# Patient Record
Sex: FEMALE | Race: BLACK OR AFRICAN AMERICAN | Employment: FULL TIME | URBAN - METROPOLITAN AREA
[De-identification: names, ages, dates, MRNs, and addresses within clinical notes are randomized per-mention and may not be internally consistent; named-entity substitution may affect disease eponyms.]

---

## 2020-03-05 ENCOUNTER — HOSPITAL ENCOUNTER (EMERGENCY)
Age: 60
Discharge: HOME | End: 2020-03-05
Attending: STUDENT IN AN ORGANIZED HEALTH CARE EDUCATION/TRAINING PROGRAM
Payer: COMMERCIAL

## 2020-03-05 VITALS
HEART RATE: 83 BPM | DIASTOLIC BLOOD PRESSURE: 54 MMHG | WEIGHT: 186 LBS | TEMPERATURE: 97.3 F | HEIGHT: 62 IN | OXYGEN SATURATION: 96 % | RESPIRATION RATE: 16 BRPM | BODY MASS INDEX: 34.23 KG/M2 | SYSTOLIC BLOOD PRESSURE: 112 MMHG

## 2020-03-05 DIAGNOSIS — A08.4 VIRAL GASTROENTERITIS: Primary | ICD-10-CM

## 2020-03-05 LAB
ALBUMIN SERPL-MCNC: 4.1 G/DL (ref 3.5–5)
ALBUMIN/GLOB SERPL: 1 {RATIO} (ref 1.1–2.2)
ALP SERPL-CCNC: 66 U/L (ref 45–117)
ALT SERPL-CCNC: 18 U/L (ref 12–78)
ANION GAP SERPL CALC-SCNC: 8 MMOL/L (ref 5–15)
APPEARANCE UR: ABNORMAL
AST SERPL-CCNC: 22 U/L (ref 15–37)
BACTERIA URNS QL MICRO: NEGATIVE /HPF
BASOPHILS # BLD: 0 K/UL (ref 0–0.1)
BASOPHILS NFR BLD: 0 % (ref 0–1)
BILIRUB SERPL-MCNC: 0.6 MG/DL (ref 0.2–1)
BILIRUB UR QL: NEGATIVE
BUN SERPL-MCNC: 16 MG/DL (ref 6–20)
BUN/CREAT SERPL: 20 (ref 12–20)
CALCIUM SERPL-MCNC: 9.2 MG/DL (ref 8.5–10.1)
CHLORIDE SERPL-SCNC: 105 MMOL/L (ref 97–108)
CO2 SERPL-SCNC: 24 MMOL/L (ref 21–32)
COLOR UR: ABNORMAL
CREAT SERPL-MCNC: 0.82 MG/DL (ref 0.55–1.02)
DIFFERENTIAL METHOD BLD: ABNORMAL
EOSINOPHIL # BLD: 0 K/UL (ref 0–0.4)
EOSINOPHIL NFR BLD: 0 % (ref 0–7)
EPITH CASTS URNS QL MICRO: ABNORMAL /LPF
ERYTHROCYTE [DISTWIDTH] IN BLOOD BY AUTOMATED COUNT: 14.2 % (ref 11.5–14.5)
GLOBULIN SER CALC-MCNC: 4 G/DL (ref 2–4)
GLUCOSE SERPL-MCNC: 149 MG/DL (ref 65–100)
GLUCOSE UR STRIP.AUTO-MCNC: NEGATIVE MG/DL
HCT VFR BLD AUTO: 46.3 % (ref 35–47)
HGB BLD-MCNC: 14.5 G/DL (ref 11.5–16)
HGB UR QL STRIP: NEGATIVE
HYALINE CASTS URNS QL MICRO: ABNORMAL /LPF (ref 0–5)
IMM GRANULOCYTES # BLD AUTO: 0 K/UL (ref 0–0.04)
IMM GRANULOCYTES NFR BLD AUTO: 0 % (ref 0–0.5)
KETONES UR QL STRIP.AUTO: NEGATIVE MG/DL
LEUKOCYTE ESTERASE UR QL STRIP.AUTO: NEGATIVE
LIPASE SERPL-CCNC: 145 U/L (ref 73–393)
LYMPHOCYTES # BLD: 1.8 K/UL (ref 0.8–3.5)
LYMPHOCYTES NFR BLD: 15 % (ref 12–49)
MCH RBC QN AUTO: 26.4 PG (ref 26–34)
MCHC RBC AUTO-ENTMCNC: 31.3 G/DL (ref 30–36.5)
MCV RBC AUTO: 84.2 FL (ref 80–99)
MONOCYTES # BLD: 0.5 K/UL (ref 0–1)
MONOCYTES NFR BLD: 4 % (ref 5–13)
NEUTS SEG # BLD: 9.9 K/UL (ref 1.8–8)
NEUTS SEG NFR BLD: 81 % (ref 32–75)
NITRITE UR QL STRIP.AUTO: NEGATIVE
NRBC # BLD: 0 K/UL (ref 0–0.01)
NRBC BLD-RTO: 0 PER 100 WBC
PH UR STRIP: 6 [PH] (ref 5–8)
PLATELET # BLD AUTO: 269 K/UL (ref 150–400)
PMV BLD AUTO: 10.7 FL (ref 8.9–12.9)
POTASSIUM SERPL-SCNC: 4.2 MMOL/L (ref 3.5–5.1)
PROT SERPL-MCNC: 8.1 G/DL (ref 6.4–8.2)
PROT UR STRIP-MCNC: ABNORMAL MG/DL
RBC # BLD AUTO: 5.5 M/UL (ref 3.8–5.2)
RBC #/AREA URNS HPF: ABNORMAL /HPF (ref 0–5)
SODIUM SERPL-SCNC: 137 MMOL/L (ref 136–145)
SP GR UR REFRACTOMETRY: 1.03 (ref 1–1.03)
UR CULT HOLD, URHOLD: NORMAL
UROBILINOGEN UR QL STRIP.AUTO: 0.2 EU/DL (ref 0.2–1)
WBC # BLD AUTO: 12.2 K/UL (ref 3.6–11)
WBC URNS QL MICRO: ABNORMAL /HPF (ref 0–4)

## 2020-03-05 PROCEDURE — 96374 THER/PROPH/DIAG INJ IV PUSH: CPT

## 2020-03-05 PROCEDURE — 36415 COLL VENOUS BLD VENIPUNCTURE: CPT

## 2020-03-05 PROCEDURE — 80053 COMPREHEN METABOLIC PANEL: CPT

## 2020-03-05 PROCEDURE — 74011000250 HC RX REV CODE- 250: Performed by: NURSE PRACTITIONER

## 2020-03-05 PROCEDURE — 74011250637 HC RX REV CODE- 250/637: Performed by: NURSE PRACTITIONER

## 2020-03-05 PROCEDURE — 85025 COMPLETE CBC W/AUTO DIFF WBC: CPT

## 2020-03-05 PROCEDURE — 96375 TX/PRO/DX INJ NEW DRUG ADDON: CPT

## 2020-03-05 PROCEDURE — 81001 URINALYSIS AUTO W/SCOPE: CPT

## 2020-03-05 PROCEDURE — 74011250636 HC RX REV CODE- 250/636: Performed by: NURSE PRACTITIONER

## 2020-03-05 PROCEDURE — 96372 THER/PROPH/DIAG INJ SC/IM: CPT

## 2020-03-05 PROCEDURE — 83690 ASSAY OF LIPASE: CPT

## 2020-03-05 PROCEDURE — 99284 EMERGENCY DEPT VISIT MOD MDM: CPT

## 2020-03-05 RX ORDER — FAMOTIDINE 20 MG/1
20 TABLET, FILM COATED ORAL 2 TIMES DAILY
Qty: 20 TAB | Refills: 0 | Status: SHIPPED | OUTPATIENT
Start: 2020-03-05 | End: 2020-03-15

## 2020-03-05 RX ORDER — ONDANSETRON 4 MG/1
4 TABLET, ORALLY DISINTEGRATING ORAL
Qty: 20 TAB | Refills: 0 | Status: SHIPPED | OUTPATIENT
Start: 2020-03-05 | End: 2022-08-13

## 2020-03-05 RX ORDER — ONDANSETRON 2 MG/ML
4 INJECTION INTRAMUSCULAR; INTRAVENOUS
Status: COMPLETED | OUTPATIENT
Start: 2020-03-05 | End: 2020-03-05

## 2020-03-05 RX ORDER — DICYCLOMINE HYDROCHLORIDE 10 MG/ML
20 INJECTION INTRAMUSCULAR
Status: COMPLETED | OUTPATIENT
Start: 2020-03-05 | End: 2020-03-05

## 2020-03-05 RX ORDER — DICYCLOMINE HYDROCHLORIDE 10 MG/1
10 CAPSULE ORAL
Qty: 20 CAP | Refills: 0 | Status: SHIPPED | OUTPATIENT
Start: 2020-03-05 | End: 2020-03-10

## 2020-03-05 RX ADMIN — FAMOTIDINE 20 MG: 10 INJECTION, SOLUTION INTRAVENOUS at 11:18

## 2020-03-05 RX ADMIN — ONDANSETRON 4 MG: 2 INJECTION INTRAMUSCULAR; INTRAVENOUS at 10:17

## 2020-03-05 RX ADMIN — LIDOCAINE HYDROCHLORIDE 40 ML: 20 SOLUTION ORAL; TOPICAL at 11:16

## 2020-03-05 RX ADMIN — DICYCLOMINE HYDROCHLORIDE 20 MG: 20 INJECTION, SOLUTION INTRAMUSCULAR at 10:19

## 2020-03-05 RX ADMIN — SODIUM CHLORIDE 1000 ML: 900 INJECTION, SOLUTION INTRAVENOUS at 10:28

## 2020-03-05 NOTE — DISCHARGE INSTRUCTIONS
Thank you for allowing us to care for you today. Please follow-up with your Primary Care provider in the next 2-3 days if your symptoms do not improve. Plan for home:       Nausea/vomiting: For the next few days watch what you eat. Eat a bland diet. Fluids are the most important. Gatorade, powerade, water are the best fluids to drink. BRAT Diet: Bananas, Rice, Applesauce, Tea/Toast.  Add foods back slowly and as you tolerate. The last type of foods to add back will be salads, acetic foods like citrus, tomatoes and spicy foods. Zofran every 6-8 hours as needed for nausea/vomiting    Bentyl 10 mg tablets every 6 hours as needed for abdominal cramping. Pepcid twice daily for 10 days. Aloe up with gastroenterology should your symptoms worsen or not improve. Come back to the ER if you have worsening symptoms, fevers over 100.9, shaking chills, nausea or vomiting. Patient Education        Gastroenteritis: Care Instructions  Your Care Instructions    Gastroenteritis is an illness that may cause nausea, vomiting, and diarrhea. It is sometimes called \"stomach flu. \" It can be caused by bacteria or a virus. You will probably begin to feel better in 1 to 2 days. In the meantime, get plenty of rest and make sure you do not become dehydrated. Dehydration occurs when your body loses too much fluid. Follow-up care is a key part of your treatment and safety. Be sure to make and go to all appointments, and call your doctor if you are having problems. It's also a good idea to know your test results and keep a list of the medicines you take. How can you care for yourself at home? · If your doctor prescribed antibiotics, take them as directed. Do not stop taking them just because you feel better. You need to take the full course of antibiotics. · Drink plenty of fluids to prevent dehydration, enough so that your urine is light yellow or clear like water.  Choose water and other caffeine-free clear liquids until you feel better. If you have kidney, heart, or liver disease and have to limit fluids, talk with your doctor before you increase your fluid intake. · Drink fluids slowly, in frequent, small amounts, because drinking too much too fast can cause vomiting. · Begin eating mild foods, such as dry toast, yogurt, applesauce, bananas, and rice. Avoid spicy, hot, or high-fat foods, and do not drink alcohol or caffeine for a day or two. Do not drink milk or eat ice cream until you are feeling better. How to prevent gastroenteritis  · Keep hot foods hot and cold foods cold. · Do not eat meats, dressings, salads, or other foods that have been kept at room temperature for more than 2 hours. · Use a thermometer to check your refrigerator. It should be between 34°F and 40°F.  · Defrost meats in the refrigerator or microwave, not on the kitchen counter. · Keep your hands and your kitchen clean. Wash your hands, cutting boards, and countertops with hot soapy water frequently. · Cook meat until it is well done. · Do not eat raw eggs or uncooked sauces made with raw eggs. · Do not take chances. If food looks or tastes spoiled, throw it out. When should you call for help? Call 911 anytime you think you may need emergency care. For example, call if:    · You vomit blood or what looks like coffee grounds.     · You passed out (lost consciousness).     · You pass maroon or very bloody stools.    Call your doctor now or seek immediate medical care if:    · You have severe belly pain.     · You have signs of needing more fluids.  You have sunken eyes, a dry mouth, and pass only a little dark urine.     · You feel like you are going to faint.     · You have increased belly pain that does not go away in 1 to 2 days.     · You have new or increased nausea, or you are vomiting.     · You have a new or higher fever.     · Your stools are black and tarlike or have streaks of blood.    Watch closely for changes in your health, and be sure to contact your doctor if:    · You are dizzy or lightheaded.     · You urinate less than usual, or your urine is dark yellow or brown.     · You do not feel better with each day that goes by. Where can you learn more? Go to http://tai-perry.info/. Enter N142 in the search box to learn more about \"Gastroenteritis: Care Instructions. \"  Current as of: June 9, 2019  Content Version: 12.2  © 6280-4797 [employer]. Care instructions adapted under license by [employer] (which disclaims liability or warranty for this information). If you have questions about a medical condition or this instruction, always ask your healthcare professional. [address] any warranty or liability for your use of this information.

## 2020-03-05 NOTE — ED TRIAGE NOTES
Patient reports she ate left over chinese food last night- reports at approx 0230 this morning she started with mid-upper abdominal pain. Patient reports vomiting x2 this morning; denies diarrhea.

## 2020-03-05 NOTE — ED PROVIDER NOTES
Initial Complaint: abd pain    Started: 2:30 am this morning    Endorses: Attributes to eating left over [place] food. Vomited x 2. Tried Mylanta and soda and vomited both up. Denies: Diarrhea, fevers    Made better: throwing up  Made worse: laying down    No further complaints. PCP:  No primary care provider on file. The history is provided by the patient. No  was used. No past medical history on file. No past surgical history on file. No family history on file. Social History     Socioeconomic History    Marital status: Not on file     Spouse name: Not on file    Number of children: Not on file    Years of education: Not on file    Highest education level: Not on file   Occupational History    Not on file   Social Needs    Financial resource strain: Not on file    Food insecurity:     Worry: Not on file     Inability: Not on file    Transportation needs:     Medical: Not on file     Non-medical: Not on file   Tobacco Use    Smoking status: Not on file   Substance and Sexual Activity    Alcohol use: Not on file    Drug use: Not on file    Sexual activity: Not on file   Lifestyle    Physical activity:     Days per week: Not on file     Minutes per session: Not on file    Stress: Not on file   Relationships    Social connections:     Talks on phone: Not on file     Gets together: Not on file     Attends Sikh service: Not on file     Active member of club or organization: Not on file     Attends meetings of clubs or organizations: Not on file     Relationship status: Not on file    Intimate partner violence:     Fear of current or ex partner: Not on file     Emotionally abused: Not on file     Physically abused: Not on file     Forced sexual activity: Not on file   Other Topics Concern    Not on file   Social History Narrative    Not on file     ALLERGIES: Patient has no known allergies.     Review of Systems   Constitutional: Positive for activity change and appetite change. Negative for chills, diaphoresis and fever. Gastrointestinal: Positive for abdominal pain, nausea and vomiting. Negative for diarrhea. Genitourinary: Negative for dysuria. Musculoskeletal: Negative for back pain. Psychiatric/Behavioral: Negative. All other systems reviewed and are negative. Vitals:    03/05/20 0925   BP: 114/68   Pulse: 83   Resp: 16   Temp: 97.3 °F (36.3 °C)   SpO2: 99%   Weight: 84.4 kg (186 lb)   Height: 5' 2\" (1.575 m)          Physical Exam  Vitals signs and nursing note reviewed. Constitutional:       Appearance: She is well-developed. HENT:      Head: Normocephalic and atraumatic. Neck:      Musculoskeletal: Normal range of motion and neck supple. Cardiovascular:      Rate and Rhythm: Normal rate and regular rhythm. Heart sounds: Normal heart sounds. Pulmonary:      Effort: Pulmonary effort is normal. No respiratory distress. Breath sounds: Normal breath sounds. No wheezing or rales. Chest:      Chest wall: No tenderness. Abdominal:      General: Bowel sounds are normal.      Palpations: Abdomen is soft. Tenderness: There is abdominal tenderness in the epigastric area. There is no right CVA tenderness, left CVA tenderness, guarding or rebound. Musculoskeletal: Normal range of motion. Skin:     General: Skin is warm and dry. Findings: No erythema. Neurological:      Mental Status: She is alert and oriented to person, place, and time. Psychiatric:         Behavior: Behavior normal.         Thought Content:  Thought content normal.         Judgment: Judgment normal.        [facility]       Procedures      Assessment & Plan:     Orders Placed This Encounter    URINE CULTURE HOLD SAMPLE    CBC WITH AUTOMATED DIFF    LIPASE    METABOLIC PANEL, COMPREHENSIVE    URINALYSIS W/MICROSCOPIC    dicyclomine (BENTYL) 10 mg/mL injection 20 mg    sodium chloride 0.9 % bolus infusion 1,000 mL    mylanta/viscous lidocaine (GI COCKTAIL)    ondansetron (ZOFRAN) injection 4 mg       Discussed with Dorinda Arevalo MD,ED Provider    Blake Ellis NP  03/05/20  9:28 AM    Still having pain. Meds given about 1 hour ago. Just giving GI cocktail now. Did not tolerate previously. Reexamination of the abdomen without pain in the right upper quadrant. Tenderness to palpation over the epigastric area. Will give GI cocktail and Pepcid and reevaluate. Blake Ellis NP  03/05/20  11:30 AM    Symptoms improved. Dc home with pepcid, zofran. Discussed return precautions. 1:47 PM  Patient re-evaluated. All questions answered. Patient appropriate for discharge. Given return precautions and follow up instructions. LABORATORY TESTS:  Labs Reviewed   CBC WITH AUTOMATED DIFF - Abnormal; Notable for the following components:       Result Value    WBC 12.2 (*)     RBC 5.50 (*)     NEUTROPHILS 81 (*)     MONOCYTES 4 (*)     ABS. NEUTROPHILS 9.9 (*)     All other components within normal limits   METABOLIC PANEL, COMPREHENSIVE - Abnormal; Notable for the following components:    Glucose 149 (*)     A-G Ratio 1.0 (*)     All other components within normal limits   URINALYSIS W/MICROSCOPIC - Abnormal; Notable for the following components:    Appearance TURBID (*)     Protein TRACE (*)     All other components within normal limits   URINE CULTURE HOLD SAMPLE   LIPASE   SAMPLES BEING HELD       IMAGING RESULTS:  No orders to display       MEDICATIONS GIVEN:  Medications   dicyclomine (BENTYL) 10 mg/mL injection 20 mg (20 mg IntraMUSCular Given 3/5/20 1019)   sodium chloride 0.9 % bolus infusion 1,000 mL (0 mL IntraVENous IV Completed 3/5/20 1228)   mylanta/viscous lidocaine (GI COCKTAIL) (40 mL Oral Given 3/5/20 1116)   ondansetron (ZOFRAN) injection 4 mg (4 mg IntraVENous Given 3/5/20 1017)   famotidine (PF) (PEPCID) 20 mg in 0.9% sodium chloride 10 mL injection (20 mg IntraVENous Given 3/5/20 1118)       IMPRESSION:  1.  Viral gastroenteritis PLAN:  1. Discharge Medication List as of 3/5/2020 12:15 PM      START taking these medications    Details   ondansetron (ZOFRAN ODT) 4 mg disintegrating tablet Take 1 Tab by mouth every eight (8) hours as needed for Nausea or Vomiting., Print, Disp-20 Tab, R-0      dicyclomine (BENTYL) 10 mg capsule Take 1 Cap by mouth four (4) times daily as needed for Abdominal Cramps (abd cramps) for up to 5 days. , Print, Disp-20 Cap, R-0      famotidine (PEPCID) 20 mg tablet Take 1 Tab by mouth two (2) times a day for 10 days. , Print, Disp-20 Tab, R-0           2. Follow-up Information     Follow up With Specialties Details Why Contact Info    Shayy Parkinson MD Family Practice Schedule an appointment as soon as possible for a visit  [place] [id number] [address] [place] [address]  Schedule an appointment as soon as possible for a visit As needed, If symptoms worsen [address] [facility],[address]    [facility] EMERGENCY DEPT Emergency Medicine  As needed, If symptoms worsen [address] [facility]  [phone]        3. Return to ED for new or worsening symptoms       Christina Ferrer NP                Please note that this dictation was completed with [employer], the computer voice recognition software. Quite often unanticipated grammatical, syntax, homophones, and other interpretive errors are inadvertently transcribed by the computer software. Please disregard these errors. Please excuse any errors that have escaped final proofreading.

## 2022-08-13 ENCOUNTER — HOSPITAL ENCOUNTER (EMERGENCY)
Age: 62
Discharge: HOME | End: 2022-08-13
Attending: STUDENT IN AN ORGANIZED HEALTH CARE EDUCATION/TRAINING PROGRAM
Payer: COMMERCIAL

## 2022-08-13 VITALS
WEIGHT: 182.98 LBS | DIASTOLIC BLOOD PRESSURE: 66 MMHG | TEMPERATURE: 98.1 F | HEART RATE: 75 BPM | BODY MASS INDEX: 33.67 KG/M2 | OXYGEN SATURATION: 98 % | HEIGHT: 62 IN | SYSTOLIC BLOOD PRESSURE: 120 MMHG | RESPIRATION RATE: 16 BRPM

## 2022-08-13 DIAGNOSIS — L25.9 CONTACT DERMATITIS, UNSPECIFIED CONTACT DERMATITIS TYPE, UNSPECIFIED TRIGGER: Primary | ICD-10-CM

## 2022-08-13 PROCEDURE — 99283 EMERGENCY DEPT VISIT LOW MDM: CPT

## 2022-08-13 RX ORDER — DIPHENHYDRAMINE HCL 25 MG
25 TABLET ORAL
COMMUNITY

## 2022-08-13 RX ORDER — TRIAMCINOLONE ACETONIDE 0.25 MG/G
OINTMENT TOPICAL 2 TIMES DAILY
Qty: 30 G | Refills: 0 | Status: SHIPPED | OUTPATIENT
Start: 2022-08-13 | End: 2022-08-20

## 2022-08-13 NOTE — ED TRIAGE NOTES
Pt presents to ED with c/o 2 day hx of rash around base of her neck. She states that she has broken out around her neck before after exposure to her jewelry.  She denies any pain but states the rash itches

## 2022-08-13 NOTE — ED NOTES
The patient was discharged home in stable condition. The patient is alert and oriented, in no respiratory distress. The patient's diagnosis, condition and treatment were explained. The patient expressed understanding. One prescriptions given. No work/school note given. A discharge plan has been developed. A  was not involved in the process. Aftercare instructions were given. Pt ambulatory out of the ED with family.

## 2022-08-13 NOTE — ED PROVIDER NOTES
60 y.o. female here today secondary to a rash on her neck. Symptoms x  3 days. Tried topical alcohol and neosporin without relief. Took benadryl without improvement. Rash is on both sides of neck and itchy. Not painful. No drainage. No fever. Had similar in past related to jewelry but hasn't been wearing a necklace. No other obvious triggers although she did wear a blouse the day before it started that came from the dry . History reviewed. No pertinent past medical history. Past Surgical History:   Procedure Laterality Date    VASCULAR SURGERY PROCEDURE UNLIST           History reviewed. No pertinent family history. Social History     Socioeconomic History    Marital status:      Spouse name: Not on file    Number of children: Not on file    Years of education: Not on file    Highest education level: Not on file   Occupational History    Not on file   Tobacco Use    Smoking status: Never    Smokeless tobacco: Never   Substance and Sexual Activity    Alcohol use: Yes    Drug use: Not on file    Sexual activity: Not on file   Other Topics Concern    Not on file   Social History Narrative    Not on file     Social Determinants of Health     Financial Resource Strain: Not on file   Food Insecurity: Not on file   Transportation Needs: Not on file   Physical Activity: Not on file   Stress: Not on file   Social Connections: Not on file   Intimate Partner Violence: Not on file   Housing Stability: Not on file         ALLERGIES: Patient has no known allergies. Review of Systems   Skin:  Positive for wound. All other systems reviewed and are negative. Vitals:    08/13/22 0943   BP: 120/66   Pulse: 75   Resp: 16   Temp: 98.1 °F (36.7 °C)   SpO2: 98%   Weight: 83 kg (182 lb 15.7 oz)   Height: 5' 2\" (1.575 m)            Physical Exam  Constitutional:       General: She is not in acute distress. Appearance: She is well-developed. HENT:      Head: Normocephalic.    Eyes: Conjunctiva/sclera: Conjunctivae normal.   Pulmonary:      Effort: Pulmonary effort is normal. No respiratory distress. Musculoskeletal:         General: Normal range of motion. Cervical back: Normal range of motion. Skin:     General: Skin is warm and dry. Capillary Refill: Capillary refill takes less than 2 seconds. Comments: Erythematous papular rash c/w contact dermatitis to b/l neck without drainage or streaking. Psychiatric:         Behavior: Behavior normal.        MDM         Procedures      60 y.o. female here with rash to neck. Suspect contact dermatitis. Doesn't look like cellulitis. Doesn't appear fungal. No clear trigger but jewelry has done this to her in the past. Will try 7 day course of triamcinolone. [id number] [address] for [facility] home with close followup. ICD-10-CM ICD-9-CM    1.  Contact dermatitis, unspecified contact dermatitis type, unspecified trigger  L25.9 692.9         Chinmay Poole DO    .